# Patient Record
(demographics unavailable — no encounter records)

---

## 2024-11-15 NOTE — HISTORY OF PRESENT ILLNESS
[de-identified] : vaccines needed/review annual behavioral health history [FreeTextEntry6] : 12 year old here for vaccines  HPI:   Feeling well today with no fever, respiratory or GI concerns No previous reaction to vaccines VIS and consent sent previously  Home: lives with parents, brother and sister (currently at [facility]) Ed ; 7th grade in [employer]; has friends; good student Activity:  on the [employer] team Mental health: feels nervous during volleyball games no feelings of sadness reported No substance use Never sexually active  Menses is regular monthly lasts 5 days

## 2024-11-15 NOTE — DISCUSSION/SUMMARY
[FreeTextEntry1] : Vaccines needed  Plan  Menactra and Flu Vaccine administered. Vaccine education done.  Updated CIR copy given Monitored X10 minutes and returned to class. Anticipatory guidance given Mom called